# Patient Record
Sex: MALE | URBAN - METROPOLITAN AREA
[De-identification: names, ages, dates, MRNs, and addresses within clinical notes are randomized per-mention and may not be internally consistent; named-entity substitution may affect disease eponyms.]

---

## 2023-04-28 ENCOUNTER — OFFICE VISIT (OUTPATIENT)
Dept: PRIMARY CARE | Facility: CLINIC | Age: 53
End: 2023-04-28
Payer: COMMERCIAL

## 2023-04-28 VITALS
SYSTOLIC BLOOD PRESSURE: 152 MMHG | TEMPERATURE: 97.1 F | BODY MASS INDEX: 35.16 KG/M2 | HEART RATE: 87 BPM | DIASTOLIC BLOOD PRESSURE: 89 MMHG | HEIGHT: 68 IN | WEIGHT: 232 LBS | OXYGEN SATURATION: 98 %

## 2023-04-28 DIAGNOSIS — I20.1 PRINZMETAL'S ANGINA (CMS-HCC): Primary | ICD-10-CM

## 2023-04-28 PROCEDURE — 1036F TOBACCO NON-USER: CPT | Performed by: FAMILY MEDICINE

## 2023-04-28 PROCEDURE — 99203 OFFICE O/P NEW LOW 30 MIN: CPT | Performed by: FAMILY MEDICINE

## 2023-04-28 RX ORDER — ISOSORBIDE MONONITRATE 30 MG/1
TABLET, EXTENDED RELEASE ORAL
COMMUNITY
Start: 2023-04-10 | End: 2023-04-28 | Stop reason: SDUPTHER

## 2023-04-28 RX ORDER — DILTIAZEM HYDROCHLORIDE 180 MG/1
180 CAPSULE, EXTENDED RELEASE ORAL DAILY
Qty: 30 CAPSULE | Refills: 2 | Status: SHIPPED | OUTPATIENT
Start: 2023-04-28 | End: 2023-08-10

## 2023-04-28 RX ORDER — DILTIAZEM HYDROCHLORIDE 180 MG/1
180 CAPSULE, EXTENDED RELEASE ORAL DAILY
COMMUNITY
Start: 2023-04-10 | End: 2023-04-28 | Stop reason: SDUPTHER

## 2023-04-28 RX ORDER — ISOSORBIDE MONONITRATE 30 MG/1
30 TABLET, EXTENDED RELEASE ORAL DAILY
Qty: 30 TABLET | Refills: 2 | Status: SHIPPED | OUTPATIENT
Start: 2023-04-28 | End: 2023-08-10

## 2023-04-28 ASSESSMENT — LIFESTYLE VARIABLES
HOW OFTEN DO YOU HAVE A DRINK CONTAINING ALCOHOL: MONTHLY OR LESS
SKIP TO QUESTIONS 9-10: 0
HOW MANY STANDARD DRINKS CONTAINING ALCOHOL DO YOU HAVE ON A TYPICAL DAY: 3 OR 4
HOW OFTEN DO YOU HAVE SIX OR MORE DRINKS ON ONE OCCASION: NEVER
AUDIT-C TOTAL SCORE: 2

## 2023-04-28 NOTE — PROGRESS NOTES
"Subjective   Patient ID: Jad Elizabeth is a 53 y.o. male who presents for Establish Care (Recently in hospital in [facility] for coronary artery spasm).    Assessment/Plan     Problem List Items Addressed This Visit    None  Visit Diagnoses       Prinzmetal's angina (CMS/HCC)    -  Primary    Relevant Medications    dilTIAZem XR (Dilacor XR) 180 mg 24 hr capsule    isosorbide mononitrate ER (Imdur) 30 mg 24 hr tablet    Other Relevant Orders    Lipid Panel    TSH with reflex to Free T4 if abnormal    Hemoglobin A1C          Medications refilled  Advised to come back for lab work  Advised to follow-up with cardiology in  Information provided  Advised to follow-up in 3 months for physical  Patient agrees  HPI    53-year-old male new patient to me  Had a recent hospitalization with dizziness lightheadedness sweating and per patient he passed out  Patient went to ER records are not available but patient was told that he has a parents mental angina  Patient had a cardiac cath done did not reveal any stenosis per patient no stent placement  Patient had a CT angiogram of the chest done result not available  Lab work reviewed    Started on Cardizem and Imdur  Needs refill  Currently asymptomatic    Smoking history for last 10 years smoking half pack per day  Not much physical active      No Known Allergies    Current Outpatient Medications   Medication Sig Dispense Refill    dilTIAZem XR (Dilacor XR) 180 mg 24 hr capsule Take 1 capsule (180 mg) by mouth once daily. 30 capsule 2    isosorbide mononitrate ER (Imdur) 30 mg 24 hr tablet Take 1 tablet (30 mg) by mouth once daily. Do not crush or chew. 30 tablet 2     No current facility-administered medications for this visit.       Objective   Visit Vitals  /89 (BP Location: Left arm, Patient Position: Sitting)   Pulse 87   Temp 36.2 °C (97.1 °F)   Ht 1.727 m (5' 8\")   Wt 105 kg (232 lb)   SpO2 98%   BMI 35.28 kg/m²   Smoking Status Former   BSA 2.24 m²     Physical " Exam  Constitutional:       General: He is not in acute distress.     Appearance: Normal appearance.   HENT:      Head: Normocephalic and atraumatic.      Nose: Nose normal.   Eyes:      Extraocular Movements: Extraocular movements intact.      Conjunctiva/sclera: Conjunctivae normal.   Cardiovascular:      Rate and Rhythm: Normal rate and regular rhythm.   Pulmonary:      Effort: Pulmonary effort is normal.      Breath sounds: Normal breath sounds.   Skin:     General: Skin is warm.   Neurological:      Mental Status: He is alert and oriented to person, place, and time.   Psychiatric:         Mood and Affect: Mood normal.         Behavior: Behavior normal.      There is no immunization history on file for this patient.    Review of Systems    No results found for any previous visit.       Radiology: Reviewed imaging in powerchart.  No results found.    No family history on file.  Social History     Socioeconomic History    Marital status: Single     Spouse name: None    Number of children: None    Years of education: None    Highest education level: None   Occupational History    None   Tobacco Use    Smoking status: Former     Packs/day: 0.25     Years: 12.00     Pack years: 3.00     Types: Cigarettes    Smokeless tobacco: Never   Vaping Use    Vaping status: None   Substance and Sexual Activity    Alcohol use: Not Currently    Drug use: Never    Sexual activity: None   Other Topics Concern    None   Social History Narrative    None     Social Determinants of Health     Financial Resource Strain: Not on file   Food Insecurity: Not on file   Transportation Needs: Not on file   Physical Activity: Not on file   Stress: Not on file   Social Connections: Not on file   Intimate Partner Violence: Not on file   Housing Stability: Not on file     History reviewed. No pertinent past medical history.  History reviewed. No pertinent surgical history.    Charting was completed using voice recognition technology and may include  unintended errors.

## 2023-08-05 DIAGNOSIS — I20.1 PRINZMETAL'S ANGINA (CMS-HCC): ICD-10-CM

## 2023-08-10 RX ORDER — DILTIAZEM HYDROCHLORIDE 180 MG/1
180 CAPSULE, EXTENDED RELEASE ORAL DAILY
Qty: 90 CAPSULE | Refills: 2 | Status: SHIPPED | OUTPATIENT
Start: 2023-08-10 | End: 2024-08-09

## 2023-08-10 RX ORDER — ISOSORBIDE MONONITRATE 30 MG/1
30 TABLET, EXTENDED RELEASE ORAL DAILY
Qty: 90 TABLET | Refills: 2 | Status: SHIPPED | OUTPATIENT
Start: 2023-08-10 | End: 2024-08-09